# Patient Record
Sex: FEMALE | Race: WHITE | NOT HISPANIC OR LATINO
[De-identification: names, ages, dates, MRNs, and addresses within clinical notes are randomized per-mention and may not be internally consistent; named-entity substitution may affect disease eponyms.]

---

## 2017-01-06 ENCOUNTER — APPOINTMENT (OUTPATIENT)
Dept: CARDIOLOGY | Facility: CLINIC | Age: 82
End: 2017-01-06

## 2017-03-13 ENCOUNTER — OUTPATIENT (OUTPATIENT)
Dept: OUTPATIENT SERVICES | Facility: HOSPITAL | Age: 82
LOS: 1 days | End: 2017-03-13

## 2018-03-28 ENCOUNTER — INPATIENT (INPATIENT)
Facility: HOSPITAL | Age: 83
LOS: 2 days | Discharge: HOME HEALTH | End: 2018-03-31
Payer: MEDICARE

## 2018-03-28 ENCOUNTER — OUTPATIENT (OUTPATIENT)
Dept: OUTPATIENT SERVICES | Facility: HOSPITAL | Age: 83
LOS: 1 days | End: 2018-03-28

## 2018-03-28 PROCEDURE — 99223 1ST HOSP IP/OBS HIGH 75: CPT

## 2018-03-28 PROCEDURE — 71045 X-RAY EXAM CHEST 1 VIEW: CPT | Mod: 26

## 2018-03-28 PROCEDURE — 99285 EMERGENCY DEPT VISIT HI MDM: CPT

## 2018-03-29 ENCOUNTER — OUTPATIENT (OUTPATIENT)
Dept: OUTPATIENT SERVICES | Facility: HOSPITAL | Age: 83
LOS: 1 days | End: 2018-03-29

## 2018-03-29 PROCEDURE — 99232 SBSQ HOSP IP/OBS MODERATE 35: CPT

## 2018-03-29 PROCEDURE — 93306 TTE W/DOPPLER COMPLETE: CPT | Mod: 26

## 2018-03-30 ENCOUNTER — OUTPATIENT (OUTPATIENT)
Dept: OUTPATIENT SERVICES | Facility: HOSPITAL | Age: 83
LOS: 1 days | End: 2018-03-30

## 2018-03-30 PROCEDURE — 99233 SBSQ HOSP IP/OBS HIGH 50: CPT

## 2018-04-12 ENCOUNTER — RX RENEWAL (OUTPATIENT)
Age: 83
End: 2018-04-12

## 2018-04-12 ENCOUNTER — OUTPATIENT (OUTPATIENT)
Dept: OUTPATIENT SERVICES | Facility: HOSPITAL | Age: 83
LOS: 1 days | End: 2018-04-12
Payer: MEDICARE

## 2018-04-12 PROCEDURE — 33282: CPT

## 2018-04-17 ENCOUNTER — APPOINTMENT (OUTPATIENT)
Dept: CARDIOLOGY | Facility: CLINIC | Age: 83
End: 2018-04-17
Payer: MEDICARE

## 2018-04-17 VITALS
HEART RATE: 58 BPM | HEIGHT: 65 IN | WEIGHT: 130 LBS | BODY MASS INDEX: 21.66 KG/M2 | DIASTOLIC BLOOD PRESSURE: 60 MMHG | SYSTOLIC BLOOD PRESSURE: 148 MMHG

## 2018-04-17 DIAGNOSIS — Z87.898 PERSONAL HISTORY OF OTHER SPECIFIED CONDITIONS: ICD-10-CM

## 2018-04-17 DIAGNOSIS — K52.9 NONINFECTIVE GASTROENTERITIS AND COLITIS, UNSPECIFIED: ICD-10-CM

## 2018-04-17 DIAGNOSIS — Z86.39 PERSONAL HISTORY OF OTHER ENDOCRINE, NUTRITIONAL AND METABOLIC DISEASE: ICD-10-CM

## 2018-04-17 DIAGNOSIS — Z86.79 PERSONAL HISTORY OF OTHER DISEASES OF THE CIRCULATORY SYSTEM: ICD-10-CM

## 2018-04-17 DIAGNOSIS — Z87.81 PERSONAL HISTORY OF (HEALED) TRAUMATIC FRACTURE: ICD-10-CM

## 2018-04-17 DIAGNOSIS — Z87.19 PERSONAL HISTORY OF OTHER DISEASES OF THE DIGESTIVE SYSTEM: ICD-10-CM

## 2018-04-17 DIAGNOSIS — Z87.39 PERSONAL HISTORY OF OTHER DISEASES OF THE MUSCULOSKELETAL SYSTEM AND CONNECTIVE TISSUE: ICD-10-CM

## 2018-04-17 PROCEDURE — 99215 OFFICE O/P EST HI 40 MIN: CPT

## 2018-04-17 PROCEDURE — 93291 INTERROG DEV EVAL SCRMS IP: CPT

## 2018-04-17 RX ORDER — RANITIDINE HYDROCHLORIDE 150 MG/1
150 CAPSULE ORAL TWICE DAILY
Refills: 0 | Status: ACTIVE | COMMUNITY

## 2018-05-15 ENCOUNTER — APPOINTMENT (OUTPATIENT)
Dept: CARDIOLOGY | Facility: CLINIC | Age: 83
End: 2018-05-15
Payer: MEDICARE

## 2018-05-15 VITALS
HEIGHT: 65 IN | HEART RATE: 64 BPM | DIASTOLIC BLOOD PRESSURE: 60 MMHG | BODY MASS INDEX: 22.49 KG/M2 | SYSTOLIC BLOOD PRESSURE: 134 MMHG | WEIGHT: 135 LBS

## 2018-05-15 PROCEDURE — 99214 OFFICE O/P EST MOD 30 MIN: CPT | Mod: 24

## 2018-05-15 RX ORDER — CIPROFLOXACIN HYDROCHLORIDE 500 MG/1
500 TABLET, FILM COATED ORAL
Qty: 10 | Refills: 0 | Status: DISCONTINUED | COMMUNITY
Start: 2018-04-12 | End: 2018-05-15

## 2018-05-23 ENCOUNTER — OUTPATIENT (OUTPATIENT)
Dept: OUTPATIENT SERVICES | Facility: HOSPITAL | Age: 83
LOS: 1 days | Discharge: HOME | End: 2018-05-23

## 2018-05-23 ENCOUNTER — APPOINTMENT (OUTPATIENT)
Dept: CARDIOLOGY | Facility: CLINIC | Age: 83
End: 2018-05-23
Payer: MEDICARE

## 2018-05-23 PROCEDURE — 93299: CPT

## 2018-05-23 PROCEDURE — 93298 REM INTERROG DEV EVAL SCRMS: CPT

## 2018-06-27 ENCOUNTER — APPOINTMENT (OUTPATIENT)
Dept: CARDIOLOGY | Facility: CLINIC | Age: 83
End: 2018-06-27
Payer: MEDICARE

## 2018-06-27 PROCEDURE — 93299: CPT

## 2018-06-27 PROCEDURE — 93298 REM INTERROG DEV EVAL SCRMS: CPT

## 2018-07-24 ENCOUNTER — APPOINTMENT (OUTPATIENT)
Dept: ELECTROPHYSIOLOGY | Facility: CLINIC | Age: 83
End: 2018-07-24
Payer: MEDICARE

## 2018-07-24 VITALS
OXYGEN SATURATION: 99 % | HEART RATE: 44 BPM | DIASTOLIC BLOOD PRESSURE: 70 MMHG | BODY MASS INDEX: 21.66 KG/M2 | SYSTOLIC BLOOD PRESSURE: 106 MMHG | WEIGHT: 130 LBS | HEIGHT: 65 IN

## 2018-07-24 PROCEDURE — 99203 OFFICE O/P NEW LOW 30 MIN: CPT

## 2018-07-24 PROCEDURE — 93285 PRGRMG DEV EVAL SCRMS IP: CPT

## 2018-08-03 ENCOUNTER — APPOINTMENT (OUTPATIENT)
Dept: CARDIOLOGY | Facility: CLINIC | Age: 83
End: 2018-08-03
Payer: MEDICARE

## 2018-08-03 PROCEDURE — 93299: CPT

## 2018-08-03 PROCEDURE — 93298 REM INTERROG DEV EVAL SCRMS: CPT

## 2018-09-14 ENCOUNTER — APPOINTMENT (OUTPATIENT)
Dept: CARDIOLOGY | Facility: CLINIC | Age: 83
End: 2018-09-14
Payer: MEDICARE

## 2018-09-14 PROCEDURE — 93298 REM INTERROG DEV EVAL SCRMS: CPT

## 2018-09-14 PROCEDURE — 93299: CPT

## 2018-09-26 ENCOUNTER — OUTPATIENT (OUTPATIENT)
Dept: OUTPATIENT SERVICES | Facility: HOSPITAL | Age: 83
LOS: 1 days | End: 2018-09-26

## 2018-10-08 ENCOUNTER — APPOINTMENT (OUTPATIENT)
Dept: ELECTROPHYSIOLOGY | Facility: CLINIC | Age: 83
End: 2018-10-08
Payer: MEDICARE

## 2018-10-08 ENCOUNTER — NON-APPOINTMENT (OUTPATIENT)
Age: 83
End: 2018-10-08

## 2018-10-08 VITALS — WEIGHT: 130 LBS | BODY MASS INDEX: 21.66 KG/M2 | HEIGHT: 65 IN

## 2018-10-08 VITALS — DIASTOLIC BLOOD PRESSURE: 76 MMHG | SYSTOLIC BLOOD PRESSURE: 146 MMHG | HEART RATE: 55 BPM

## 2018-10-08 PROCEDURE — 93000 ELECTROCARDIOGRAM COMPLETE: CPT

## 2018-10-08 PROCEDURE — 99203 OFFICE O/P NEW LOW 30 MIN: CPT

## 2018-10-15 ENCOUNTER — APPOINTMENT (OUTPATIENT)
Dept: CARDIOLOGY | Facility: CLINIC | Age: 83
End: 2018-10-15
Payer: MEDICARE

## 2018-10-15 PROCEDURE — 93298 REM INTERROG DEV EVAL SCRMS: CPT

## 2018-10-15 PROCEDURE — 93299: CPT

## 2018-10-16 ENCOUNTER — APPOINTMENT (OUTPATIENT)
Dept: CARDIOLOGY | Facility: CLINIC | Age: 83
End: 2018-10-16
Payer: MEDICARE

## 2018-10-16 VITALS
BODY MASS INDEX: 21.66 KG/M2 | HEART RATE: 67 BPM | DIASTOLIC BLOOD PRESSURE: 68 MMHG | SYSTOLIC BLOOD PRESSURE: 112 MMHG | HEIGHT: 65 IN | WEIGHT: 130 LBS

## 2018-10-16 DIAGNOSIS — Z09 ENCOUNTER FOR FOLLOW-UP EXAMINATION AFTER COMPLETED TREATMENT FOR CONDITIONS OTHER THAN MALIGNANT NEOPLASM: ICD-10-CM

## 2018-10-16 PROCEDURE — 99214 OFFICE O/P EST MOD 30 MIN: CPT

## 2018-10-16 RX ORDER — LEVOTHYROXINE SODIUM 0.09 MG/1
88 TABLET ORAL DAILY
Refills: 0 | Status: ACTIVE | COMMUNITY

## 2018-10-16 RX ORDER — SERTRALINE 25 MG/1
25 TABLET, FILM COATED ORAL DAILY
Refills: 0 | Status: ACTIVE | COMMUNITY

## 2018-10-16 RX ORDER — PROPRANOLOL HYDROCHLORIDE 20 MG/1
20 TABLET ORAL DAILY
Refills: 0 | Status: DISCONTINUED | COMMUNITY
End: 2018-10-16

## 2018-11-20 ENCOUNTER — APPOINTMENT (OUTPATIENT)
Dept: CARDIOLOGY | Facility: CLINIC | Age: 83
End: 2018-11-20
Payer: MEDICARE

## 2018-11-20 PROCEDURE — 93299: CPT

## 2018-11-20 PROCEDURE — 93298 REM INTERROG DEV EVAL SCRMS: CPT

## 2018-11-20 NOTE — ASSESSMENT
[FreeTextEntry1] : Remote Lnq Summary\par \par 11-20-18 \par Viewed lnq summary 9-10-18 to 10-2-18. (some AF overwritten). \par Episode viewed as AF with slow VR, avg v rate 34bpm at 1:33am. \par Pt refuses ppm. On Ac. \par Next summary on PA schedule 32days--cv

## 2018-12-27 ENCOUNTER — APPOINTMENT (OUTPATIENT)
Dept: CARDIOLOGY | Facility: CLINIC | Age: 83
End: 2018-12-27
Payer: MEDICARE

## 2018-12-27 PROCEDURE — 93298 REM INTERROG DEV EVAL SCRMS: CPT

## 2018-12-27 PROCEDURE — 93299: CPT

## 2018-12-27 NOTE — ASSESSMENT
[FreeTextEntry1] : Remote LINQ Summary 12-27-18 \par \par 30 day LINQ summary for dates 10-2-18 through 11-11-18. \par Reviewed episodes 190-192. \par Episode 190 reveals AF with controlled rate, on AC with Eliquis. 191-92 are pauses x 5 s in sleep. \par \par As noted previously, pt declines PPM, and there has been no syncope associated with pause.\par \par Next summary 32 days on PA schedule. \par \par Sincerely,\par \par Meghana Braga MSPAC\par \par \par

## 2018-12-27 NOTE — REASON FOR VISIT
[___ Device Check] : [unfilled] device check [Follow-up Device Check] : follow-up device check visit [Other ___] : [unfilled]

## 2019-01-22 ENCOUNTER — APPOINTMENT (OUTPATIENT)
Dept: CARDIOLOGY | Facility: CLINIC | Age: 84
End: 2019-01-22
Payer: MEDICARE

## 2019-01-22 VITALS
WEIGHT: 293 LBS | HEART RATE: 41 BPM | OXYGEN SATURATION: 99 % | HEIGHT: 65 IN | SYSTOLIC BLOOD PRESSURE: 140 MMHG | BODY MASS INDEX: 48.82 KG/M2 | DIASTOLIC BLOOD PRESSURE: 60 MMHG

## 2019-01-22 PROCEDURE — 99214 OFFICE O/P EST MOD 30 MIN: CPT

## 2019-01-22 RX ORDER — LORATADINE 5 MG
TABLET,CHEWABLE ORAL
Refills: 0 | Status: ACTIVE | COMMUNITY

## 2019-01-22 RX ORDER — PRAMIPEXOLE DIHYDROCHLORIDE 0.75 MG/1
TABLET ORAL
Refills: 0 | Status: ACTIVE | COMMUNITY

## 2019-02-01 ENCOUNTER — APPOINTMENT (OUTPATIENT)
Dept: CARDIOLOGY | Facility: CLINIC | Age: 84
End: 2019-02-01
Payer: MEDICARE

## 2019-02-01 PROCEDURE — 93298 REM INTERROG DEV EVAL SCRMS: CPT

## 2019-02-01 PROCEDURE — 93299: CPT

## 2019-02-01 NOTE — ASSESSMENT
[FreeTextEntry1] : Remote LNq Summary\par \par 2-1-19\par Viewed lnq summary 11-11-18 to 12-8-18. \par Events viewed as AF with slow rate, lowest avg v rate 34bpm with 4.8secpause with AF. \par Patient declines ppm, on AC.\par  Patient has f/u to review next week with Dr. Valentino.\par  Next summary on PA schedule 32days--cv

## 2019-02-05 ENCOUNTER — APPOINTMENT (OUTPATIENT)
Dept: CARDIOLOGY | Facility: CLINIC | Age: 84
End: 2019-02-05
Payer: MEDICARE

## 2019-02-05 VITALS
SYSTOLIC BLOOD PRESSURE: 150 MMHG | HEART RATE: 39 BPM | WEIGHT: 130 LBS | HEIGHT: 65 IN | BODY MASS INDEX: 21.66 KG/M2 | DIASTOLIC BLOOD PRESSURE: 60 MMHG | OXYGEN SATURATION: 97 %

## 2019-02-05 PROCEDURE — 99214 OFFICE O/P EST MOD 30 MIN: CPT

## 2019-02-15 ENCOUNTER — MEDICATION RENEWAL (OUTPATIENT)
Age: 84
End: 2019-02-15

## 2019-02-15 NOTE — DISCUSSION/SUMMARY
[FreeTextEntry1] : Jaleesa is a 89-year-old female with medical history detailed above and active medical issues including:\par \par - PAF, asymptomatic pauses during sleep, no indication for pacemaker at present, patient declines pacemaker if needed in the future.  Continue lLR monitoring and if syncope with bradycardia may discuss PPM again.  Continue low dose Eliquis.  Avoid AV matt blocking agents.\par \par - [facility] [place], rhabdomyolysis, ARF, NSTEMI conservative management \par \par - HTN on Cozaar 50 mg daily at BP goal less than 130/80\par \par - Gait instability, wheelchair-bound\par \par - History of hip fracture\par \par - Hypothyroid on Synthroid\par \par Patient educated on lifestyle and diet modification with low sodium diet and avoidance of excessive caffeine and alcohol. Patient is aware to call with any symptoms or concerns. \par \par Cardiology followup 3 months. Patient will have 2-D echocardiogram to assess for  LV systolic function, wall motion and  structural heart disease.  Current cardiac medications remain unchanged. Repeat labs will be ordered with PMD.\par \par Jaleesa will followup with Dr Ashvin Mayer for primary care\par \par \par

## 2019-02-15 NOTE — REASON FOR VISIT
[Follow-Up - Clinic] : a clinic follow-up of [FreeTextEntry2] : Afib with slow ventricular response, pauses while sleeping, no symptomatic bradycardia daytime hours on ILR, patient declines PPM [FreeTextEntry1] : Mrs. Montanez is a 89  year old female hx hypothyroidism ,Edema, Osteoarthritis, unsteady gait , PAF on low dose Eliquis, Hypertension, rhabdomyolysis/ARF/NSTEMI, GERD, colorectal surgery, R hip fracture \par \par Cardiovascular review of symptoms is negative for exertional chest pain, dyspnea, palpitations, dizziness or syncope.  No PND or orthopnea leg edema.  No bleeding or black stool.\par \par Patient is wheelchair-bound with 24h home aide. Occasional ambulation with a walker, no recent falling.\par \par [place] [facility] [place] and fall at [place]. No evidence of arrhythmia on telemetry patient discharged for outpatient followup and ILR implant. Patient had Reveal LinQ ILR implant 4/12/18. \par \par LINQ interrogation reveals multiple episodes of severe bradycardia with HR <30 while sleeping.  No symptoms reported with pauses. No inidication for PPM at this point in light of asxs gil episodes during sleep (average HR during the day is around 50 bpm based upon ILR trends. Patient declines PM implantation if needed in future.  \par \par Reveal LinQ ILR PAF no pauses up to January 2019\par \par On 7-23-16 [place] [facility] [place] with gait instability and recent falling. Patient has ELY (creatinine 2.2) secondary to rhabdomyolosis. Troponins as high as 0.872, CPK 20,000.Patient was evaluated by nephrologist Dr. Robertson and cardiologist Dr. Valentino. Conserverative management for NSTEMI in setting of ARF.  Patient had new onset atrial fibrillation during the hospitalization. Patient declined a pacemaker if needed. \par \par [place] [facility] [place] LVEF 60%, moderate diastolic dysfunction, severely dilated RA, mild/moderate MR, severe TR, moderate pulmonary hypertension, PASP 54 mmHg\par  \par

## 2019-02-15 NOTE — REASON FOR VISIT
[Follow-Up - Clinic] : a clinic follow-up of [FreeTextEntry2] : Afib with slow ventricular response while sleeping, no symptomatic bradycardia daytime hours on ILR [FreeTextEntry1] : Mrs. Montanez is a 89  year old female hx hypothyroidism ,Edema, Osteoarthritis, unsteady gait , PAF on low dose Eliquis, Hypertension, rhabdomyolysis/ARF/NSTEMI, GERD, colorectal surgery, R hip fracture \par \par Cardiovascular review of symptoms is negative for exertional chest pain, dyspnea, palpitations, dizziness or syncope.  No PND or orthopnea leg edema.  No bleeding or black stool.\par \par Patient is wheelchair-bound with 24h home aide. Occasional ambulation with a walker, no recent falling.\par \par [place] [facility] [place] and fall at [place]. No evidence of arrhythmia on telemetry patient discharged for outpatient followup and ILR implant. Patient had Reveal LinQ ILR implant 4/12/18. \par \par LINQ interrogation reveals multiple episodes of severe bradycardia with HR <30 while sleeping.  No symptoms reported with pauses. No inidication for PPM at this point in light of asxs gil episodes during sleep (average HR during the day is around 50 bpm based upon ILR trends. Patient declines PM implantation if needed in future.  \par \par Reveal LinQ ILR PAF no pauses up to January 2019\par \par [place] 7[place]23-16 [place] [facility] [place] with gait instability and recent falling. Patient has ELY (creatinine 2.2) secondary to rhabdomyolosis. Troponins as high as 0.872, CPK 20,000.Patient was evaluated by nephrologist Dr. Robertson and cardiologist Dr. Valentino. Conserverative management for NSTEMI in setting of ARF.  Patient had new onset atrial fibrillation during the hospitalization. Patient declined a pacemaker if needed. \par \par [place] [facility] [place] LVEF 60%, moderate diastolic dysfunction, severely dilated RA, mild/moderate MR, severe TR, moderate pulmonary hypertension, PASP 54 mmHg\par  \par

## 2019-02-15 NOTE — PHYSICAL EXAM
[Normal Appearance] : normal appearance [Well Groomed] : well groomed [No Deformities] : no deformities [General Appearance - In No Acute Distress] : no acute distress [Normal Conjunctiva] : the conjunctiva exhibited no abnormalities [Eyelids - No Xanthelasma] : the eyelids demonstrated no xanthelasmas [Normal Oral Mucosa] : normal oral mucosa [No Oral Pallor] : no oral pallor [No Oral Cyanosis] : no oral cyanosis [Normal Jugular Venous A Waves Present] : normal jugular venous A waves present [Normal Jugular Venous V Waves Present] : normal jugular venous V waves present [No Jugular Venous Ordoñez A Waves] : no jugular venous ordoñez A waves [Respiration, Rhythm And Depth] : normal respiratory rhythm and effort [Exaggerated Use Of Accessory Muscles For Inspiration] : no accessory muscle use [Auscultation Breath Sounds / Voice Sounds] : lungs were clear to auscultation bilaterally [Heart Sounds] : normal S1 and S2 [Abdomen Soft] : soft [Abdomen Tenderness] : non-tender [Abdomen Mass (___ Cm)] : no abdominal mass palpated [Abnormal Walk] : normal gait [Gait - Sufficient For Exercise Testing] : the gait was sufficient for exercise testing [Nail Clubbing] : no clubbing of the fingernails [Cyanosis, Localized] : no localized cyanosis [Petechial Hemorrhages (___cm)] : no petechial hemorrhages [Skin Color & Pigmentation] : normal skin color and pigmentation [] : no rash [No Venous Stasis] : no venous stasis [Skin Lesions] : no skin lesions [No Skin Ulcers] : no skin ulcer [No Xanthoma] : no  xanthoma was observed [Oriented To Time, Place, And Person] : oriented to person, place, and time [Affect] : the affect was normal [Mood] : the mood was normal [No Anxiety] : not feeling anxious [FreeTextEntry1] : irregular S1S2, 2/6SEM MR

## 2019-02-15 NOTE — PHYSICAL EXAM
[Normal Appearance] : normal appearance [Well Groomed] : well groomed [No Deformities] : no deformities [General Appearance - In No Acute Distress] : no acute distress [Normal Conjunctiva] : the conjunctiva exhibited no abnormalities [Eyelids - No Xanthelasma] : the eyelids demonstrated no xanthelasmas [Normal Oral Mucosa] : normal oral mucosa [No Oral Pallor] : no oral pallor [No Oral Cyanosis] : no oral cyanosis [Normal Jugular Venous A Waves Present] : normal jugular venous A waves present [Normal Jugular Venous V Waves Present] : normal jugular venous V waves present [No Jugular Venous Ordoñez A Waves] : no jugular venous ordoñez A waves [Respiration, Rhythm And Depth] : normal respiratory rhythm and effort [Exaggerated Use Of Accessory Muscles For Inspiration] : no accessory muscle use [Auscultation Breath Sounds / Voice Sounds] : lungs were clear to auscultation bilaterally [Heart Sounds] : normal S1 and S2 [Abdomen Soft] : soft [Abdomen Tenderness] : non-tender [Abdomen Mass (___ Cm)] : no abdominal mass palpated [Nail Clubbing] : no clubbing of the fingernails [Cyanosis, Localized] : no localized cyanosis [Petechial Hemorrhages (___cm)] : no petechial hemorrhages [Skin Color & Pigmentation] : normal skin color and pigmentation [] : no rash [No Venous Stasis] : no venous stasis [Skin Lesions] : no skin lesions [No Skin Ulcers] : no skin ulcer [No Xanthoma] : no  xanthoma was observed [Oriented To Time, Place, And Person] : oriented to person, place, and time [Affect] : the affect was normal [Mood] : the mood was normal [No Anxiety] : not feeling anxious [FreeTextEntry1] : limited ambulation wheel chair bound

## 2019-02-15 NOTE — DISCUSSION/SUMMARY
[FreeTextEntry1] : Jaleesa is a 89-year-old female with medical history detailed above and active medical issues including:\par \par - PAF, asymptomatic pauses during sleep, no indication for pacemaker at present, patient declines pacemaker if needed in the future.  Continue lLR monitoring and if syncope with bradycardia may discuss PPM again.  Continue low dose Eliquis.  Avoid AV matt blocking agents.\par \par - [facility] [place] for falling, rhabdomyolysis, ARF, NSTEMI conservative management \par \par - HTN on Cozaar 50 mg twice daily at BP goal less than 130/80\par \par - Gait instability, wheelchair-bound\par \par - History of hip fracture. Limited ambulation wheel chair bound\par \par - Hypothyroid on Synthroid\par \par Patient educated on lifestyle and diet modification with low sodium diet and avoidance of excessive caffeine and alcohol. Patient is aware to call with any symptoms or concerns. \par \par Cardiology followup 3 months. Patient will have 2-D echocardiogram to assess for  LV systolic function, wall motion and  structural heart disease.  Current cardiac medications remain unchanged. Repeat labs will be ordered with PMD.\par \par Jaleesa will followup with Dr Ashvin Mayer for primary care\par \par \par

## 2019-02-18 ENCOUNTER — RX RENEWAL (OUTPATIENT)
Age: 84
End: 2019-02-18

## 2019-02-18 RX ORDER — LOSARTAN POTASSIUM 50 MG/1
50 TABLET, FILM COATED ORAL TWICE DAILY
Qty: 180 | Refills: 0 | Status: ACTIVE | COMMUNITY
Start: 1900-01-01 | End: 1900-01-01

## 2019-03-08 ENCOUNTER — APPOINTMENT (OUTPATIENT)
Dept: CARDIOLOGY | Facility: CLINIC | Age: 84
End: 2019-03-08
Payer: MEDICARE

## 2019-03-08 PROCEDURE — 93298 REM INTERROG DEV EVAL SCRMS: CPT

## 2019-03-08 PROCEDURE — 93299: CPT

## 2019-03-08 NOTE — ASSESSMENT
[FreeTextEntry1] : Remote Lnq Summary\par \par 3-8-19 \par Viewed lnq summary 12-8-18 to 1-12-19. \par Events viewed as Af with gil response with sleep, AF rate controlled and 5sec pause with sleep. \par Patient declines ppm. On Ac. \par Next summary on PA schedule 32days--cv

## 2019-03-12 ENCOUNTER — OUTPATIENT (OUTPATIENT)
Dept: OUTPATIENT SERVICES | Facility: HOSPITAL | Age: 84
LOS: 1 days | End: 2019-03-12

## 2019-03-12 ENCOUNTER — INPATIENT (INPATIENT)
Facility: HOSPITAL | Age: 84
LOS: 7 days | Discharge: HOME | End: 2019-03-20
Admitting: FAMILY MEDICINE
Payer: MEDICARE

## 2019-03-12 PROCEDURE — 99285 EMERGENCY DEPT VISIT HI MDM: CPT

## 2019-03-12 PROCEDURE — 70450 CT HEAD/BRAIN W/O DYE: CPT | Mod: 26

## 2019-03-12 PROCEDURE — 93010 ELECTROCARDIOGRAM REPORT: CPT

## 2019-03-12 PROCEDURE — 71045 X-RAY EXAM CHEST 1 VIEW: CPT | Mod: 26

## 2019-03-13 ENCOUNTER — OUTPATIENT (OUTPATIENT)
Dept: OUTPATIENT SERVICES | Facility: HOSPITAL | Age: 84
LOS: 1 days | End: 2019-03-13

## 2019-03-13 PROCEDURE — 74018 RADEX ABDOMEN 1 VIEW: CPT | Mod: 26

## 2019-03-14 ENCOUNTER — OUTPATIENT (OUTPATIENT)
Dept: OUTPATIENT SERVICES | Facility: HOSPITAL | Age: 84
LOS: 1 days | End: 2019-03-14

## 2019-03-14 PROCEDURE — 74176 CT ABD & PELVIS W/O CONTRAST: CPT | Mod: 26

## 2019-03-14 PROCEDURE — 99223 1ST HOSP IP/OBS HIGH 75: CPT

## 2019-03-14 PROCEDURE — 74018 RADEX ABDOMEN 1 VIEW: CPT | Mod: 26,76,77

## 2019-03-14 PROCEDURE — 74018 RADEX ABDOMEN 1 VIEW: CPT | Mod: 26

## 2019-03-15 PROCEDURE — 99232 SBSQ HOSP IP/OBS MODERATE 35: CPT

## 2019-03-15 PROCEDURE — 71045 X-RAY EXAM CHEST 1 VIEW: CPT | Mod: 26,76

## 2019-03-15 PROCEDURE — 93010 ELECTROCARDIOGRAM REPORT: CPT

## 2019-03-16 PROCEDURE — 99232 SBSQ HOSP IP/OBS MODERATE 35: CPT

## 2019-03-17 ENCOUNTER — OUTPATIENT (OUTPATIENT)
Dept: OUTPATIENT SERVICES | Facility: HOSPITAL | Age: 84
LOS: 1 days | End: 2019-03-17

## 2019-03-17 PROCEDURE — 71045 X-RAY EXAM CHEST 1 VIEW: CPT | Mod: 26

## 2019-03-18 ENCOUNTER — OUTPATIENT (OUTPATIENT)
Dept: OUTPATIENT SERVICES | Facility: HOSPITAL | Age: 84
LOS: 1 days | End: 2019-03-18

## 2019-03-18 PROCEDURE — 74018 RADEX ABDOMEN 1 VIEW: CPT | Mod: 26

## 2019-03-19 PROCEDURE — 74230 X-RAY XM SWLNG FUNCJ C+: CPT | Mod: 26

## 2019-04-12 ENCOUNTER — APPOINTMENT (OUTPATIENT)
Dept: CARDIOLOGY | Facility: CLINIC | Age: 84
End: 2019-04-12
Payer: MEDICARE

## 2019-04-12 PROCEDURE — 93299: CPT

## 2019-04-12 PROCEDURE — 93298 REM INTERROG DEV EVAL SCRMS: CPT

## 2019-04-12 NOTE — ASSESSMENT
[FreeTextEntry1] : Remote LNq Summary\par \par \par 4-12-19 \par Viewed lnq summary 1-12-19 to 2-12-19.\par  Episodes viewed as AF, gil, 5 sec pause with sleep. PVC. On AC. \par Patient saw PV 1-22-19.\par Patient declines ppm. Avoid AV matt blocking agents. \par Next summary on PA schedule 32d--cv

## 2019-04-12 NOTE — ADDENDUM
[FreeTextEntry1] : Please note I personally reviewed the above with PA.  I was physically present during the service of the patient and directly involved in the management plan and recommendations of care for the patient.\par  .1 PTT > 200

## 2019-05-14 ENCOUNTER — APPOINTMENT (OUTPATIENT)
Dept: CARDIOLOGY | Facility: CLINIC | Age: 84
End: 2019-05-14

## 2019-05-17 ENCOUNTER — APPOINTMENT (OUTPATIENT)
Dept: CARDIOLOGY | Facility: CLINIC | Age: 84
End: 2019-05-17
Payer: MEDICARE

## 2019-05-17 PROCEDURE — 93298 REM INTERROG DEV EVAL SCRMS: CPT

## 2019-05-17 PROCEDURE — 93299: CPT

## 2019-05-17 NOTE — ASSESSMENT
[FreeTextEntry1] : Remote Lnq Summary\par \par 5-17-19 \par Viewed lnq summary 2-12-19 to 4-8-19. \par Event was viewed as AF/PVC's, rate controlled on AC. \par (other episodes of AF,were  overwritten as device programmed to only hold longest episodes). Next summary on pa sched 32d--cv

## 2019-06-20 ENCOUNTER — APPOINTMENT (OUTPATIENT)
Dept: CARDIOLOGY | Facility: CLINIC | Age: 84
End: 2019-06-20
Payer: MEDICARE

## 2019-06-20 DIAGNOSIS — I47.2 VENTRICULAR TACHYCARDIA: ICD-10-CM

## 2019-06-20 PROCEDURE — 93299: CPT

## 2019-06-20 PROCEDURE — 93298 REM INTERROG DEV EVAL SCRMS: CPT

## 2019-06-20 NOTE — ASSESSMENT
[FreeTextEntry1] : Remote Lnq Summary\par \par \par 6-20-19 \par Viewed lnq summary 4-8-19 to 5-11-19 (some episodes overwritten, spoke to [employer] support), episodes read as AF/pvc's rate controlled on AC and 5 beat NSVT (cath 12-12-18 patent prior stent with 80% ostium of second branch PTCA performed normal EF) \par No changes per EP, Dr. Figueroa. \par Next summary on PA schedule 32days--cv\par \par -requesting most recent labs

## 2019-07-23 ENCOUNTER — APPOINTMENT (OUTPATIENT)
Dept: CARDIOLOGY | Facility: CLINIC | Age: 84
End: 2019-07-23

## 2019-07-29 ENCOUNTER — APPOINTMENT (OUTPATIENT)
Dept: CARDIOLOGY | Facility: CLINIC | Age: 84
End: 2019-07-29

## 2019-08-14 ENCOUNTER — OUTPATIENT (OUTPATIENT)
Dept: OUTPATIENT SERVICES | Facility: HOSPITAL | Age: 84
LOS: 1 days | End: 2019-08-14

## 2019-09-13 ENCOUNTER — APPOINTMENT (OUTPATIENT)
Dept: CARDIOLOGY | Facility: CLINIC | Age: 84
End: 2019-09-13
Payer: MEDICARE

## 2019-09-13 PROCEDURE — 93298 REM INTERROG DEV EVAL SCRMS: CPT

## 2019-09-13 PROCEDURE — 93299: CPT

## 2019-09-13 NOTE — ASSESSMENT
[FreeTextEntry1] : Remote LNq Summary\par \par \par 9-13-19 \par Viewed lnq summary 7-12-19 to 8-13-19. \par Events were viewed as AF, rate controlled on Ac. \par Next summary on pa schedule 32days-cv

## 2019-10-15 ENCOUNTER — APPOINTMENT (OUTPATIENT)
Dept: CARDIOLOGY | Facility: CLINIC | Age: 84
End: 2019-10-15
Payer: MEDICARE

## 2019-10-15 PROCEDURE — 93299: CPT

## 2019-10-15 PROCEDURE — 93298 REM INTERROG DEV EVAL SCRMS: CPT

## 2019-10-23 NOTE — ASSESSMENT
[FreeTextEntry1] : Remote Lnq Summary\par \par 10-15-19 \par Viewed lnq summary 8-13-19 to 9-3-19. \par Episode viewed as AF, rate controlled on AC.\par  Next summary on pa schedule 32days-cv

## 2019-11-19 ENCOUNTER — APPOINTMENT (OUTPATIENT)
Dept: CARDIOLOGY | Facility: CLINIC | Age: 84
End: 2019-11-19
Payer: MEDICARE

## 2019-11-19 PROCEDURE — 93298 REM INTERROG DEV EVAL SCRMS: CPT

## 2019-11-19 PROCEDURE — 93299: CPT

## 2019-11-19 NOTE — ASSESSMENT
[FreeTextEntry1] : Remote LNq Summary\par \par 11-19-19 \par Viewed lnq summary 9-3-19 to 9-30-19.\par No events were noted. \par Next summary on pa schedule 32days-cv

## 2020-01-03 ENCOUNTER — APPOINTMENT (OUTPATIENT)
Dept: CARDIOLOGY | Facility: CLINIC | Age: 85
End: 2020-01-03
Payer: MEDICARE

## 2020-01-03 PROCEDURE — 93298 REM INTERROG DEV EVAL SCRMS: CPT

## 2020-01-03 PROCEDURE — G2066: CPT

## 2020-01-06 NOTE — ASSESSMENT
[FreeTextEntry1] : Remote LNq summary\par \par 1-3-20 \par Viewed lnq summary 9-30-19 to 11-16-19. \par No events were noted. \par Next summary on pa schedule 32days-cv

## 2020-02-07 ENCOUNTER — APPOINTMENT (OUTPATIENT)
Dept: CARDIOLOGY | Facility: CLINIC | Age: 85
End: 2020-02-07
Payer: MEDICARE

## 2020-02-07 PROCEDURE — G2066: CPT

## 2020-02-07 PROCEDURE — 93298 REM INTERROG DEV EVAL SCRMS: CPT

## 2020-02-08 NOTE — ASSESSMENT
[FreeTextEntry1] : Remote Lnq summary\par \par \par 2-7-20 \par Viewed lnq summary 11-16-19 to 12-13-19. \par No events were noted. \par NExt summary on pa sched 32d-cv\par

## 2020-03-19 ENCOUNTER — APPOINTMENT (OUTPATIENT)
Dept: CARDIOLOGY | Facility: CLINIC | Age: 85
End: 2020-03-19
Payer: MEDICARE

## 2020-03-19 PROCEDURE — 93298 REM INTERROG DEV EVAL SCRMS: CPT

## 2020-03-19 PROCEDURE — G2066: CPT

## 2020-03-19 NOTE — ASSESSMENT
[FreeTextEntry1] : Remote Lnq Summary\par \par 3-19-20 \par Viewed lnq summary 12-13-19 to 1-16-20.\par No events were noted. \par Next summary on pa schedule 32days-cv

## 2020-04-21 ENCOUNTER — APPOINTMENT (OUTPATIENT)
Dept: CARDIOLOGY | Facility: CLINIC | Age: 85
End: 2020-04-21
Payer: MEDICARE

## 2020-04-21 PROCEDURE — G2066: CPT

## 2020-04-21 PROCEDURE — 93298 REM INTERROG DEV EVAL SCRMS: CPT

## 2020-04-21 NOTE — ASSESSMENT
[FreeTextEntry1] : Remote LNq Summary\par \par 4-21-20 \par Viewed lnq summary 1-16-20 to 2-17-20. \par No events were noted.\par  Next summary on pa schedule 32days-cv

## 2020-06-02 ENCOUNTER — APPOINTMENT (OUTPATIENT)
Dept: CARDIOLOGY | Facility: CLINIC | Age: 85
End: 2020-06-02
Payer: MEDICARE

## 2020-06-02 PROCEDURE — G2066: CPT

## 2020-06-02 PROCEDURE — 93298 REM INTERROG DEV EVAL SCRMS: CPT

## 2020-06-02 NOTE — ASSESSMENT
[FreeTextEntry1] : Remote LNq summary\par \par 6-2-20 \par Viewed lnq summary 2-17-20 to 3-18-20. \par No events were noted. \par Next summary on pa schedule 32days-cv

## 2020-07-09 ENCOUNTER — APPOINTMENT (OUTPATIENT)
Dept: CARDIOLOGY | Facility: CLINIC | Age: 85
End: 2020-07-09
Payer: MEDICARE

## 2020-07-09 PROCEDURE — G2066: CPT

## 2020-07-09 PROCEDURE — 93298 REM INTERROG DEV EVAL SCRMS: CPT

## 2020-07-09 NOTE — ASSESSMENT
[FreeTextEntry1] : Remote Lnq Summary\par \par 7-9-20 viewed lnq summary 3-18-20 to 4-14-20. \par No events were noted. (current egm in AF, on ac). \par Next summary on pa sched 32d-cv\par \par task FD pt due for f/u

## 2020-08-14 ENCOUNTER — APPOINTMENT (OUTPATIENT)
Dept: CARDIOLOGY | Facility: CLINIC | Age: 85
End: 2020-08-14
Payer: MEDICARE

## 2020-08-14 PROCEDURE — 93298 REM INTERROG DEV EVAL SCRMS: CPT

## 2020-08-14 PROCEDURE — G2066: CPT

## 2020-08-14 NOTE — ASSESSMENT
[FreeTextEntry1] : Remote Lnq Summary\par \par 8-14-20 \par Viewed lnq summary 4-14-20 to 5-12-20, 5-12-20 to 6-21-20 and 6-21-20 to 7-23-20 (approved by admin)\par Episodes of AF, PVC's, artifact and <3sec pauses. \par %of time in AT/AF 0.8%\par rate controlled, on Ac. \par Due to f/u with cardiologist, tasked FD\par \par Next summary 32d-cv

## 2020-09-18 ENCOUNTER — APPOINTMENT (OUTPATIENT)
Dept: CARDIOLOGY | Facility: CLINIC | Age: 85
End: 2020-09-18
Payer: MEDICARE

## 2020-09-18 PROCEDURE — G2066: CPT

## 2020-09-18 PROCEDURE — 93298 REM INTERROG DEV EVAL SCRMS: CPT

## 2020-09-18 NOTE — ASSESSMENT
[FreeTextEntry1] : Remote Lnq summary\par \par \par 9-18-20\par Viewed lnq summary 7-23-20 to 8-11-20. \par AF with 2.2-2.4sc pause avg v rate 52bpm at 3:21am. \par On Ac. \par Pending f/u with PV 9/29\par Next summary on pa schedule 32d-cv. \par

## 2020-09-29 ENCOUNTER — APPOINTMENT (OUTPATIENT)
Dept: CARDIOLOGY | Facility: CLINIC | Age: 85
End: 2020-09-29
Payer: MEDICARE

## 2020-09-29 VITALS
SYSTOLIC BLOOD PRESSURE: 130 MMHG | OXYGEN SATURATION: 95 % | HEIGHT: 65 IN | TEMPERATURE: 97.1 F | WEIGHT: 130 LBS | HEART RATE: 47 BPM | BODY MASS INDEX: 21.66 KG/M2 | DIASTOLIC BLOOD PRESSURE: 60 MMHG

## 2020-09-29 DIAGNOSIS — I10 ESSENTIAL (PRIMARY) HYPERTENSION: ICD-10-CM

## 2020-09-29 PROCEDURE — 99214 OFFICE O/P EST MOD 30 MIN: CPT

## 2020-09-29 NOTE — REASON FOR VISIT
[Follow-Up - Clinic] : a clinic follow-up of [FreeTextEntry2] : AF with slow ventricular response, pauses while sleeping, no symptomatic bradycardia daytime hours on ILR, patient declines PPM [FreeTextEntry1] : Mrs. Montanez is a 89  year old female hx hypothyroidism ,Edema, Osteoarthritis, unsteady gait , PAF on low dose Eliquis, Hypertension, rhabdomyolysis/ARF/NSTEMI, GERD, colorectal surgery, R hip fracture \par \par Cardiovascular review of symptoms is negative for exertional chest pain, dyspnea, palpitations, dizziness or syncope.  No PND or orthopnea leg edema.  No bleeding or black stool.\par \par Patient seen today with home health aide able to confirm accurate history. Patient is wheelchair-bound with 24h home aide. Occasional ambulation with a walker, no recent falling.\par \par Patient [place] [facility] [place] for syncope and fall at home. No evidence of arrhythmia on telemetry patient discharged for outpatient followup and ILR implant. Patient had Reveal LinQ ILR implant 4/12/18. \par \par LINQ interrogation reveals multiple episodes of severe bradycardia with HR <30 while sleeping.  No symptoms reported with pauses. No inidication for PPM at this point in light of asxs gil episodes during sleep (average HR during the day is around 50 bpm based upon ILR trends. Patient declines PM implantation if needed in future.  \par \par Reveal LinQ ILR PAF no pauses up to January 2019\par \par On 7-23-16 patient [place] [facility] [place] with gait instability and recent falling. Patient has LEY (creatinine 2.2) secondary to rhabdomyolosis. Troponins as high as 0.872, CPK 20,000.Patient was evaluated by nephrologist Dr. Robertson and cardiologist Dr. Valentino. Conserverative management for NSTEMI in setting of ARF.  Patient had new onset atrial fibrillation during the hospitalization. Patient declined a pacemaker if needed. \par \par 2-D echo [facility] [place] LVEF 60%, moderate diastolic dysfunction, severely dilated RA, mild/moderate MR, severe TR, moderate pulmonary hypertension, PASP 54 mmHg\par  \par

## 2020-09-29 NOTE — DISCUSSION/SUMMARY
[FreeTextEntry1] : Jaleesa is a 89-year-old female with medical history detailed above and active medical issues including:\par \par - PAF, asymptomatic pauses during sleep, no indication for pacemaker at present, patient declines pacemaker if needed in the future.  Continue lLR monitoring and if syncope with bradycardia may discuss PPM again.  Continue low dose Eliquis.  Avoid AV matt blocking agents.\par \par - [facility] [place] for falling, rhabdomyolysis, ARF, NSTEMI conservative management \par \par - HTN on Cozaar 50 mg twice daily at BP goal less than 130/80\par \par - Gait instability, wheelchair-bound\par \par - History of hip fracture. Limited ambulation wheel chair bound\par \par - Hypothyroid on Synthroid\par \par Patient educated on lifestyle and diet modification with low sodium diet and avoidance of excessive caffeine and alcohol. Patient is aware to call with any symptoms or concerns. \par \par Cardiology followup 6 months. Patient will have 2-D echocardiogram to assess for  LV systolic function, wall motion and  structural heart disease.  Current cardiac medications remain unchanged. Repeat labs will be ordered with PMD.\par \par Jaleesa will followup with Dr Ashvin Mayer for primary care\par \par \par

## 2020-10-20 ENCOUNTER — APPOINTMENT (OUTPATIENT)
Dept: CARDIOLOGY | Facility: CLINIC | Age: 85
End: 2020-10-20
Payer: MEDICARE

## 2020-10-20 PROCEDURE — G2066: CPT

## 2020-10-20 PROCEDURE — 93298 REM INTERROG DEV EVAL SCRMS: CPT

## 2020-10-20 RX ORDER — APIXABAN 2.5 MG/1
2.5 TABLET, FILM COATED ORAL
Qty: 180 | Refills: 1 | Status: ACTIVE | COMMUNITY
Start: 1900-01-01 | End: 1900-01-01

## 2020-10-20 NOTE — ASSESSMENT
[FreeTextEntry1] : Remote Lnq Summary\par \par 10-20-20 \par Viewed lnq summary 8-11-20 to 9-21-20. \par Events of AF, rate controlled. on Ac (tasked nursing to update med list). \par Next summary on daisha alw 32d-cv

## 2020-11-23 ENCOUNTER — APPOINTMENT (OUTPATIENT)
Dept: CARDIOLOGY | Facility: CLINIC | Age: 85
End: 2020-11-23
Payer: MEDICARE

## 2020-11-23 PROCEDURE — G2066: CPT

## 2020-11-23 PROCEDURE — 93298 REM INTERROG DEV EVAL SCRMS: CPT

## 2020-11-23 NOTE — ASSESSMENT
[FreeTextEntry1] : Remote LNQ Summary\par \par 11-23-20\par Viewed lnq summary 9-21-20 to 11-5-20.\par  No events were noted. \par Next summary on pa schedule 32d-cv

## 2021-01-05 ENCOUNTER — APPOINTMENT (OUTPATIENT)
Dept: CARDIOLOGY | Facility: CLINIC | Age: 86
End: 2021-01-05
Payer: MEDICARE

## 2021-01-05 DIAGNOSIS — I48.91 UNSPECIFIED ATRIAL FIBRILLATION: ICD-10-CM

## 2021-01-05 PROCEDURE — 93298 REM INTERROG DEV EVAL SCRMS: CPT

## 2021-01-05 PROCEDURE — G2066: CPT

## 2021-01-05 NOTE — ASSESSMENT
[FreeTextEntry1] : Remote LNQ Summary 1/5/21\par \par Viewed lnq summary 11/5/20 to 11/30/20.\par  No events were noted. \par Next summary 32d

## 2021-02-05 ENCOUNTER — APPOINTMENT (OUTPATIENT)
Dept: CARDIOLOGY | Facility: CLINIC | Age: 86
End: 2021-02-05
Payer: MEDICARE

## 2021-02-05 DIAGNOSIS — I45.5 OTHER SPECIFIED HEART BLOCK: ICD-10-CM

## 2021-02-05 DIAGNOSIS — R55 SYNCOPE AND COLLAPSE: ICD-10-CM

## 2021-02-05 DIAGNOSIS — Z98.890 OTHER SPECIFIED POSTPROCEDURAL STATES: ICD-10-CM

## 2021-02-05 PROCEDURE — G2066: CPT

## 2021-02-05 PROCEDURE — 93298 REM INTERROG DEV EVAL SCRMS: CPT

## 2021-02-08 NOTE — ASSESSMENT
[FreeTextEntry1] : \par 2/5/21\par \par Viewed lnq summary 11/30/20 to 1/19/21. \par No events were noted.\par  NExt summary on daisha stallings 32d-cv

## 2021-03-10 ENCOUNTER — APPOINTMENT (OUTPATIENT)
Dept: CARDIOLOGY | Facility: CLINIC | Age: 86
End: 2021-03-10
Payer: MEDICARE

## 2021-03-10 ENCOUNTER — NON-APPOINTMENT (OUTPATIENT)
Age: 86
End: 2021-03-10

## 2021-03-10 PROCEDURE — 93298 REM INTERROG DEV EVAL SCRMS: CPT

## 2021-03-10 PROCEDURE — G2066: CPT

## 2021-04-15 ENCOUNTER — NON-APPOINTMENT (OUTPATIENT)
Age: 86
End: 2021-04-15

## 2021-04-15 ENCOUNTER — APPOINTMENT (OUTPATIENT)
Dept: CARDIOLOGY | Facility: CLINIC | Age: 86
End: 2021-04-15
Payer: MEDICARE

## 2021-04-15 PROCEDURE — G2066: CPT

## 2021-04-15 PROCEDURE — 93298 REM INTERROG DEV EVAL SCRMS: CPT

## 2021-05-25 ENCOUNTER — APPOINTMENT (OUTPATIENT)
Dept: CARDIOLOGY | Facility: CLINIC | Age: 86
End: 2021-05-25
Payer: MEDICARE

## 2021-05-25 ENCOUNTER — OUTPATIENT (OUTPATIENT)
Dept: OUTPATIENT SERVICES | Facility: HOSPITAL | Age: 86
LOS: 1 days | End: 2021-05-25

## 2021-05-25 ENCOUNTER — NON-APPOINTMENT (OUTPATIENT)
Age: 86
End: 2021-05-25

## 2021-05-25 PROCEDURE — G2066: CPT

## 2021-05-25 PROCEDURE — 93298 REM INTERROG DEV EVAL SCRMS: CPT

## 2021-05-26 ENCOUNTER — OUTPATIENT (OUTPATIENT)
Dept: OUTPATIENT SERVICES | Facility: HOSPITAL | Age: 86
LOS: 1 days | End: 2021-05-26

## 2021-05-29 ENCOUNTER — OUTPATIENT (OUTPATIENT)
Dept: OUTPATIENT SERVICES | Facility: HOSPITAL | Age: 86
LOS: 1 days | End: 2021-05-29

## 2021-06-09 ENCOUNTER — OUTPATIENT (OUTPATIENT)
Dept: OUTPATIENT SERVICES | Facility: HOSPITAL | Age: 86
LOS: 1 days | End: 2021-06-09

## 2021-06-17 ENCOUNTER — INPATIENT (INPATIENT)
Facility: HOSPITAL | Age: 86
LOS: 5 days | Discharge: HOME | End: 2021-06-23
Attending: INTERNAL MEDICINE | Admitting: STUDENT IN AN ORGANIZED HEALTH CARE EDUCATION/TRAINING PROGRAM
Payer: MEDICARE

## 2021-06-17 ENCOUNTER — OUTPATIENT (OUTPATIENT)
Dept: OUTPATIENT SERVICES | Facility: HOSPITAL | Age: 86
LOS: 1 days | End: 2021-06-17

## 2021-06-17 PROCEDURE — 71045 X-RAY EXAM CHEST 1 VIEW: CPT | Mod: 26

## 2021-06-17 PROCEDURE — 93010 ELECTROCARDIOGRAM REPORT: CPT

## 2021-06-17 PROCEDURE — 99285 EMERGENCY DEPT VISIT HI MDM: CPT

## 2021-06-18 ENCOUNTER — OUTPATIENT (OUTPATIENT)
Dept: OUTPATIENT SERVICES | Facility: HOSPITAL | Age: 86
LOS: 1 days | End: 2021-06-18

## 2021-06-19 ENCOUNTER — OUTPATIENT (OUTPATIENT)
Dept: OUTPATIENT SERVICES | Facility: HOSPITAL | Age: 86
LOS: 1 days | End: 2021-06-19

## 2021-06-20 ENCOUNTER — OUTPATIENT (OUTPATIENT)
Dept: OUTPATIENT SERVICES | Facility: HOSPITAL | Age: 86
LOS: 1 days | End: 2021-06-20

## 2021-06-21 ENCOUNTER — OUTPATIENT (OUTPATIENT)
Dept: OUTPATIENT SERVICES | Facility: HOSPITAL | Age: 86
LOS: 1 days | End: 2021-06-21

## 2021-06-22 ENCOUNTER — OUTPATIENT (OUTPATIENT)
Dept: OUTPATIENT SERVICES | Facility: HOSPITAL | Age: 86
LOS: 1 days | End: 2021-06-22

## 2021-06-23 ENCOUNTER — OUTPATIENT (OUTPATIENT)
Dept: OUTPATIENT SERVICES | Facility: HOSPITAL | Age: 86
LOS: 1 days | End: 2021-06-23

## 2021-06-25 ENCOUNTER — APPOINTMENT (OUTPATIENT)
Dept: CARDIOLOGY | Facility: CLINIC | Age: 86
End: 2021-06-25

## 2021-06-29 ENCOUNTER — NON-APPOINTMENT (OUTPATIENT)
Age: 86
End: 2021-06-29

## 2021-06-30 ENCOUNTER — APPOINTMENT (OUTPATIENT)
Dept: CARDIOLOGY | Facility: CLINIC | Age: 86
End: 2021-06-30
Payer: MEDICARE

## 2021-06-30 PROCEDURE — 93298 REM INTERROG DEV EVAL SCRMS: CPT

## 2021-06-30 PROCEDURE — G2066: CPT

## 2021-08-03 ENCOUNTER — APPOINTMENT (OUTPATIENT)
Dept: CARDIOLOGY | Facility: CLINIC | Age: 86
End: 2021-08-03
Payer: MEDICARE

## 2021-08-03 ENCOUNTER — NON-APPOINTMENT (OUTPATIENT)
Age: 86
End: 2021-08-03

## 2021-08-03 PROCEDURE — 93298 REM INTERROG DEV EVAL SCRMS: CPT | Mod: GW

## 2021-08-03 PROCEDURE — G2066: CPT | Mod: GW

## 2021-09-08 ENCOUNTER — APPOINTMENT (OUTPATIENT)
Dept: CARDIOLOGY | Facility: CLINIC | Age: 86
End: 2021-09-08
Payer: MEDICAID

## 2021-09-08 ENCOUNTER — NON-APPOINTMENT (OUTPATIENT)
Age: 86
End: 2021-09-08

## 2021-09-08 PROCEDURE — G2066: CPT

## 2021-09-08 PROCEDURE — 93298 REM INTERROG DEV EVAL SCRMS: CPT

## 2021-10-08 ENCOUNTER — APPOINTMENT (OUTPATIENT)
Dept: ELECTROPHYSIOLOGY | Facility: CLINIC | Age: 86
End: 2021-10-08

## 2021-10-13 ENCOUNTER — NON-APPOINTMENT (OUTPATIENT)
Age: 86
End: 2021-10-13

## 2021-10-13 ENCOUNTER — APPOINTMENT (OUTPATIENT)
Dept: CARDIOLOGY | Facility: CLINIC | Age: 86
End: 2021-10-13
Payer: MEDICARE

## 2021-10-13 PROCEDURE — 93298 REM INTERROG DEV EVAL SCRMS: CPT

## 2021-10-13 PROCEDURE — G2066: CPT

## 2021-11-17 ENCOUNTER — APPOINTMENT (OUTPATIENT)
Dept: CARDIOLOGY | Facility: CLINIC | Age: 86
End: 2021-11-17

## 2021-12-10 ENCOUNTER — APPOINTMENT (OUTPATIENT)
Dept: CARDIOLOGY | Facility: CLINIC | Age: 86
End: 2021-12-10